# Patient Record
Sex: FEMALE | Race: WHITE | Employment: PART TIME | URBAN - METROPOLITAN AREA
[De-identification: names, ages, dates, MRNs, and addresses within clinical notes are randomized per-mention and may not be internally consistent; named-entity substitution may affect disease eponyms.]

---

## 2017-03-25 ENCOUNTER — HOSPITAL ENCOUNTER (EMERGENCY)
Age: 22
Discharge: HOME | End: 2017-03-25
Attending: EMERGENCY MEDICINE
Payer: COMMERCIAL

## 2017-03-25 VITALS
SYSTOLIC BLOOD PRESSURE: 132 MMHG | HEART RATE: 87 BPM | HEIGHT: 68 IN | DIASTOLIC BLOOD PRESSURE: 75 MMHG | TEMPERATURE: 98 F | OXYGEN SATURATION: 98 % | RESPIRATION RATE: 18 BRPM | WEIGHT: 204 LBS | BODY MASS INDEX: 30.92 KG/M2

## 2017-03-25 DIAGNOSIS — N39.0 URINARY TRACT INFECTION WITHOUT HEMATURIA, SITE UNSPECIFIED: Primary | ICD-10-CM

## 2017-03-25 LAB
BILIRUB UR QL STRIP.AUTO: NEGATIVE
COLOR UR AUTO: YELLOW
GLUCOSE UR STRIP.AUTO-MCNC: NEGATIVE MG/DL
KETONES UR STRIP.AUTO-MCNC: NEGATIVE MG/DL
NITRITE UR QL STRIP.AUTO: NEGATIVE
PH UR STRIP.AUTO: 7.5 [PH] (ref 4.5–8)
POCT LOT NUMBER: NORMAL
POCT URINE PREGNANCY: NEGATIVE
RBC UR QL AUTO: NEGATIVE
SP GR UR STRIP.AUTO: 1.02 (ref 1–1.03)
UROBILINOGEN UR STRIP.AUTO-MCNC: 0.2 MG/DL

## 2017-03-25 PROCEDURE — 87086 URINE CULTURE/COLONY COUNT: CPT | Performed by: EMERGENCY MEDICINE

## 2017-03-25 PROCEDURE — 81001 URINALYSIS AUTO W/SCOPE: CPT | Performed by: EMERGENCY MEDICINE

## 2017-03-25 PROCEDURE — 81025 URINE PREGNANCY TEST: CPT

## 2017-03-25 PROCEDURE — 99283 EMERGENCY DEPT VISIT LOW MDM: CPT

## 2017-03-25 RX ORDER — SULFAMETHOXAZOLE AND TRIMETHOPRIM 800; 160 MG/1; MG/1
1 TABLET ORAL 2 TIMES DAILY
Qty: 6 TABLET | Refills: 0 | Status: SHIPPED | OUTPATIENT
Start: 2017-03-25 | End: 2017-03-28

## 2017-03-25 NOTE — ED PROVIDER NOTES
Patient Seen in: THE [facility] Emergency Department In [place]    History   Patient presents with:   Other    Stated Complaint: LMP 6 wks ago; neg preg test; \"does not feel right\"    HPI    24-year-old female presents emergency room with chief complaint of except as otherwise stated in HPI.     Physical Exam       ED Triage Vitals   BP 03/25/17 0823 132/75 mmHg   Pulse 03/25/17 0823 87   Resp 03/25/17 0823 18   Temp 03/25/17 0823 97.6 °F (36.4 °C)   Temp src 03/25/17 0823 Temporal   SpO2 03/25/17 0823 98 % MDM   I discussed all results with the patient. Patient does have urinary tract infection and will be treated with Bactrim ×3 days. Patient at this time is well-appearing, abdomen is soft and nontender.   Patient will follow up with primary care and

## 2017-12-26 ENCOUNTER — HOSPITAL ENCOUNTER (EMERGENCY)
Age: 22
Discharge: HOME | End: 2017-12-26
Attending: EMERGENCY MEDICINE
Payer: COMMERCIAL

## 2017-12-26 VITALS
HEART RATE: 125 BPM | HEIGHT: 68 IN | WEIGHT: 200 LBS | SYSTOLIC BLOOD PRESSURE: 104 MMHG | OXYGEN SATURATION: 99 % | TEMPERATURE: 103 F | BODY MASS INDEX: 30.31 KG/M2 | DIASTOLIC BLOOD PRESSURE: 48 MMHG | RESPIRATION RATE: 20 BRPM

## 2017-12-26 DIAGNOSIS — R19.7 NAUSEA VOMITING AND DIARRHEA: Primary | ICD-10-CM

## 2017-12-26 DIAGNOSIS — R11.2 NAUSEA VOMITING AND DIARRHEA: Primary | ICD-10-CM

## 2017-12-26 PROCEDURE — 81025 URINE PREGNANCY TEST: CPT

## 2017-12-26 PROCEDURE — 96374 THER/PROPH/DIAG INJ IV PUSH: CPT

## 2017-12-26 PROCEDURE — 99284 EMERGENCY DEPT VISIT MOD MDM: CPT

## 2017-12-26 PROCEDURE — 80053 COMPREHEN METABOLIC PANEL: CPT | Performed by: EMERGENCY MEDICINE

## 2017-12-26 PROCEDURE — 87086 URINE CULTURE/COLONY COUNT: CPT | Performed by: EMERGENCY MEDICINE

## 2017-12-26 PROCEDURE — 96361 HYDRATE IV INFUSION ADD-ON: CPT

## 2017-12-26 PROCEDURE — 81015 MICROSCOPIC EXAM OF URINE: CPT | Performed by: EMERGENCY MEDICINE

## 2017-12-26 PROCEDURE — 96375 TX/PRO/DX INJ NEW DRUG ADDON: CPT

## 2017-12-26 PROCEDURE — 81001 URINALYSIS AUTO W/SCOPE: CPT | Performed by: EMERGENCY MEDICINE

## 2017-12-26 PROCEDURE — 85025 COMPLETE CBC W/AUTO DIFF WBC: CPT | Performed by: EMERGENCY MEDICINE

## 2017-12-26 RX ORDER — METOCLOPRAMIDE HYDROCHLORIDE 5 MG/ML
10 INJECTION INTRAMUSCULAR; INTRAVENOUS ONCE
Status: COMPLETED | OUTPATIENT
Start: 2017-12-26 | End: 2017-12-26

## 2017-12-26 RX ORDER — ONDANSETRON 2 MG/ML
4 INJECTION INTRAMUSCULAR; INTRAVENOUS ONCE
Status: COMPLETED | OUTPATIENT
Start: 2017-12-26 | End: 2017-12-26

## 2017-12-26 RX ORDER — METOCLOPRAMIDE 10 MG/1
10 TABLET ORAL 3 TIMES DAILY PRN
Qty: 20 TABLET | Refills: 0 | Status: SHIPPED | OUTPATIENT
Start: 2017-12-26 | End: 2018-01-25

## 2017-12-26 RX ORDER — ACETAMINOPHEN 500 MG
1000 TABLET ORAL ONCE
Status: COMPLETED | OUTPATIENT
Start: 2017-12-26 | End: 2017-12-26

## 2017-12-27 NOTE — ED PROVIDER NOTES
Patient Seen in: THE [facility] Emergency Department In [place]    History   Patient presents with:  Nausea/Vomiting/Diarrhea (gastrointestinal)    Stated Complaint:     HPI    Patient is a 21-year-old female who states that she woke up this morning at 4:30 A rhythm, no murmurs. BACK: No CVA tenderness, no midline bony tenderness. ABDOMEN: + Bowel sounds, soft, nontender, nondistended. No rebound, no guarding, no hepatosplenomegaly. EXTREMITIES: Full range of motion, no tenderness, good capillary refill.   Srinivasan Schaffer improvement of her nausea. Patient was given a second liter of IV fluids. Patient was able to tolerate p.o. liquids felt comfortable going home. Recommend symptomatic treatment with Reglan clear liquids.   Patient is to follow-up for further evaluation r

## 2023-03-03 NOTE — ED INITIAL ASSESSMENT (HPI)
Vomiting and diarrhea since 0430
Detail Level: Zone
Continue Regimen: Accutane 30 mg capsule \\nQuantity: 60.0 Capsule  Days Supply: 30\\nSig: Take 1 tablet PO BID

## (undated) NOTE — LETTER
December 26, 2017    Patient: Steffen Maunabo   Date of Visit: 12/26/2017       To Whom It May Concern:    Elli Ellsworth was seen and treated in our emergency department on 12/26/2017. She should not return to work until 12/28/2017.     If you have

## (undated) NOTE — ED AVS SNAPSHOT
[facility] Emergency Department in [address] [facility]    Phone:  [phone]    Fax:  [address] [facility]   MRN: [id number]    Department:  [facility] Emergency Department in [place]   Date of Vi covered by your plan. Please contact your insurance company to determine coverage for follow-up care and referrals.     [address] [facility] [employer] [place] [phone]  Pediatric [phone] Emergency Department   [phone] to by a radiologist.  If there is a significant change in your reading, you will be contacted. Please make sure we have your correct phone number before you leave. After you leave, you should follow the attached instructions.      I have read and understand th [facility]. MyChart     Sign up for [employer], your secure online medical record. [employer] will allow you to access patient instructions from your recent visit,  view other health information, and more.  To sign up or find more information,

## (undated) NOTE — ED AVS SNAPSHOT
THE [facility] Emergency Department in [address]    Phone:  [phone]    Fax:  [address]   MRN: [id number]    Department:  THE [facility] Emergency Department in [facility]   Date of Vi IF THERE IS ANY CHANGE OR WORSENING OF YOUR CONDITION, CALL YOUR PRIMARY CARE PHYSICIAN AT ONCE OR RETURN IMMEDIATELY TO THE EMERGENCY DEPARTMENT.     If you have been prescribed any medication(s), please fill your prescription right away and begin taking t

## (undated) NOTE — ED AVS SNAPSHOT
Noreen Looney   MRN: [id number]    Department:  THE [facility] Emergency Department in [place]   Date of Visit:  12/26/2017           Disclosure     Insurance plans vary and the physician(s) referred by the ER may not be covered by your plan.  Please c tell this physician (or your personal doctor if your instructions are to return to your personal doctor) about any new or lasting problems. The primary care or specialist physician will see patients referred from the [facility] Emergency Department.  Shelton Lombard